# Patient Record
Sex: FEMALE | Race: WHITE
[De-identification: names, ages, dates, MRNs, and addresses within clinical notes are randomized per-mention and may not be internally consistent; named-entity substitution may affect disease eponyms.]

---

## 2017-02-16 ENCOUNTER — HOSPITAL ENCOUNTER (OUTPATIENT)
Dept: HOSPITAL 74 - JONCCHEMO | Age: 82
Discharge: HOME | End: 2017-02-16
Attending: INTERNAL MEDICINE
Payer: COMMERCIAL

## 2017-02-16 VITALS — HEART RATE: 82 BPM | DIASTOLIC BLOOD PRESSURE: 54 MMHG | TEMPERATURE: 98.3 F | SYSTOLIC BLOOD PRESSURE: 108 MMHG

## 2017-02-16 VITALS — BODY MASS INDEX: 27.8 KG/M2

## 2017-02-16 DIAGNOSIS — C91.10: ICD-10-CM

## 2017-02-16 DIAGNOSIS — Z51.11: Primary | ICD-10-CM

## 2017-02-16 LAB
ALBUMIN SERPL-MCNC: 3.6 G/DL (ref 3.4–5)
ALP SERPL-CCNC: 81 U/L (ref 45–117)
ALT SERPL-CCNC: 26 U/L (ref 12–78)
ANION GAP SERPL CALC-SCNC: 8 MMOL/L (ref 8–16)
AST SERPL-CCNC: 22 U/L (ref 15–37)
BASOPHILS # BLD: 1.2 % (ref 0–2)
BASOPHILS # BLD: 1.3 % (ref 0–2)
BILIRUB SERPL-MCNC: 0.6 MG/DL (ref 0.2–1)
CALCIUM SERPL-MCNC: 9.1 MG/DL (ref 8.5–10.1)
CO2 SERPL-SCNC: 29 MMOL/L (ref 21–32)
CREAT SERPL-MCNC: 0.9 MG/DL (ref 0.55–1.02)
DEPRECATED RDW RBC AUTO: 13 % (ref 11.6–15.6)
DEPRECATED RDW RBC AUTO: 13.3 % (ref 11.6–15.6)
EOSINOPHIL # BLD: 4.7 % (ref 0–4.5)
EOSINOPHIL # BLD: 5.8 % (ref 0–4.5)
GLUCOSE SERPL-MCNC: 129 MG/DL (ref 74–106)
MCH RBC QN AUTO: 29.1 PG (ref 25.7–33.7)
MCH RBC QN AUTO: 29.2 PG (ref 25.7–33.7)
MCHC RBC AUTO-ENTMCNC: 33 G/DL (ref 32–36)
MCHC RBC AUTO-ENTMCNC: 33.4 G/DL (ref 32–36)
MCV RBC: 87.6 FL (ref 80–96)
MCV RBC: 88.1 FL (ref 80–96)
NEUTROPHILS # BLD: 58.5 % (ref 42.8–82.8)
NEUTROPHILS # BLD: 62 % (ref 42.8–82.8)
PLATELET # BLD AUTO: 218 K/MM3 (ref 134–434)
PLATELET # BLD AUTO: 225 K/MM3 (ref 134–434)
PMV BLD: 9.1 FL (ref 7.5–11.1)
PMV BLD: 9.2 FL (ref 7.5–11.1)
PROT SERPL-MCNC: 7.7 G/DL (ref 6.4–8.2)
WBC # BLD AUTO: 6.4 K/MM3 (ref 4–10)
WBC # BLD AUTO: 6.4 K/MM3 (ref 4–10)

## 2017-02-16 PROCEDURE — 3E03305 INTRODUCTION OF OTHER ANTINEOPLASTIC INTO PERIPHERAL VEIN, PERCUTANEOUS APPROACH: ICD-10-PCS | Performed by: INTERNAL MEDICINE

## 2017-02-16 PROCEDURE — 3E0337Z INTRODUCTION OF ELECTROLYTIC AND WATER BALANCE SUBSTANCE INTO PERIPHERAL VEIN, PERCUTANEOUS APPROACH: ICD-10-PCS | Performed by: INTERNAL MEDICINE

## 2017-02-16 PROCEDURE — 3E033GC INTRODUCTION OF OTHER THERAPEUTIC SUBSTANCE INTO PERIPHERAL VEIN, PERCUTANEOUS APPROACH: ICD-10-PCS | Performed by: INTERNAL MEDICINE

## 2017-02-17 LAB
IGG SERPL-MCNC: 1300 MG/DL (ref 700–1600)
IGM SERPL-MCNC: 188 MG/DL (ref 26–217)

## 2017-02-21 LAB
ALBUMIN SERPL-MCNC: 3.5 G/DL (ref 2.9–4.4)
ALBUMIN/GLOB SERPL: 0.9 {RATIO} (ref 0.7–1.7)
GLOBULIN SER CALC-MCNC: 4 G/DL (ref 2.2–3.9)
PROT SERPL-MCNC: 7.5 G/DL (ref 6–8.5)

## 2017-04-12 ENCOUNTER — HOSPITAL ENCOUNTER (OUTPATIENT)
Dept: HOSPITAL 74 - JONCCHEMO | Age: 82
Discharge: HOME | End: 2017-04-12
Attending: INTERNAL MEDICINE
Payer: COMMERCIAL

## 2017-04-12 VITALS — SYSTOLIC BLOOD PRESSURE: 118 MMHG | HEART RATE: 78 BPM | DIASTOLIC BLOOD PRESSURE: 71 MMHG | TEMPERATURE: 97.8 F

## 2017-04-12 DIAGNOSIS — M06.9: ICD-10-CM

## 2017-04-12 DIAGNOSIS — C91.10: ICD-10-CM

## 2017-04-12 DIAGNOSIS — Z51.11: Primary | ICD-10-CM

## 2017-04-12 DIAGNOSIS — D47.2: ICD-10-CM

## 2017-04-12 LAB
ALBUMIN SERPL-MCNC: 3.6 G/DL (ref 3.4–5)
ALP SERPL-CCNC: 82 U/L (ref 45–117)
ALT SERPL-CCNC: 25 U/L (ref 12–78)
ANION GAP SERPL CALC-SCNC: 8 MMOL/L (ref 8–16)
AST SERPL-CCNC: 25 U/L (ref 15–37)
BASOPHILS # BLD: 0.9 % (ref 0–2)
BILIRUB CONJ SERPL-MCNC: 0.1 MG/DL (ref 0–0.2)
BILIRUB DIRECT SERPL-MCNC: 184 U/L (ref 84–246)
BILIRUB SERPL-MCNC: 0.5 MG/DL (ref 0.2–1)
CALCIUM SERPL-MCNC: 9.3 MG/DL (ref 8.5–10.1)
CO2 SERPL-SCNC: 29 MMOL/L (ref 21–32)
COCKROFT - GAULT: 53.66
CREAT SERPL-MCNC: 0.9 MG/DL (ref 0.55–1.02)
DEPRECATED RDW RBC AUTO: 13.1 % (ref 11.6–15.6)
EOSINOPHIL # BLD: 6.3 % (ref 0–4.5)
GLUCOSE SERPL-MCNC: 142 MG/DL (ref 74–106)
MAGNESIUM SERPL-MCNC: 2.5 MG/DL (ref 1.8–2.4)
MCH RBC QN AUTO: 28.8 PG (ref 25.7–33.7)
MCHC RBC AUTO-ENTMCNC: 32.7 G/DL (ref 32–36)
MCV RBC: 88.3 FL (ref 80–96)
NEUTROPHILS # BLD: 55.3 % (ref 42.8–82.8)
PLATELET # BLD AUTO: 214 K/MM3 (ref 134–434)
PMV BLD: 9.3 FL (ref 7.5–11.1)
PROT SERPL-MCNC: 7.2 G/DL (ref 6.4–8.2)
URATE SERPL-SCNC: 4.5 MG/DL (ref 2.6–7.2)
WBC # BLD AUTO: 5.6 K/MM3 (ref 4–10)

## 2017-04-12 PROCEDURE — 3E033GC INTRODUCTION OF OTHER THERAPEUTIC SUBSTANCE INTO PERIPHERAL VEIN, PERCUTANEOUS APPROACH: ICD-10-PCS | Performed by: INTERNAL MEDICINE

## 2017-04-12 PROCEDURE — 3E0337Z INTRODUCTION OF ELECTROLYTIC AND WATER BALANCE SUBSTANCE INTO PERIPHERAL VEIN, PERCUTANEOUS APPROACH: ICD-10-PCS | Performed by: INTERNAL MEDICINE

## 2017-04-12 PROCEDURE — 3E03305 INTRODUCTION OF OTHER ANTINEOPLASTIC INTO PERIPHERAL VEIN, PERCUTANEOUS APPROACH: ICD-10-PCS | Performed by: INTERNAL MEDICINE

## 2017-04-13 LAB
IGG SERPL-MCNC: 1122 MG/DL (ref 700–1600)
IGM SERPL-MCNC: 164 MG/DL (ref 26–217)

## 2017-06-07 ENCOUNTER — HOSPITAL ENCOUNTER (OUTPATIENT)
Dept: HOSPITAL 74 - JONCCHEMO | Age: 82
Discharge: HOME | End: 2017-06-07
Attending: INTERNAL MEDICINE
Payer: COMMERCIAL

## 2017-06-07 VITALS — TEMPERATURE: 98.3 F | HEART RATE: 73 BPM | DIASTOLIC BLOOD PRESSURE: 76 MMHG | SYSTOLIC BLOOD PRESSURE: 154 MMHG

## 2017-06-07 DIAGNOSIS — M06.9: ICD-10-CM

## 2017-06-07 DIAGNOSIS — C91.10: ICD-10-CM

## 2017-06-07 DIAGNOSIS — D47.2: ICD-10-CM

## 2017-06-07 DIAGNOSIS — Z51.11: Primary | ICD-10-CM

## 2017-06-07 LAB
ALBUMIN SERPL-MCNC: 3.7 G/DL (ref 3.4–5)
ALP SERPL-CCNC: 81 U/L (ref 45–117)
ALT SERPL-CCNC: 27 U/L (ref 12–78)
ANION GAP SERPL CALC-SCNC: 9 MMOL/L (ref 8–16)
AST SERPL-CCNC: 21 U/L (ref 15–37)
BASOPHILS # BLD: 1.3 % (ref 0–2)
BILIRUB DIRECT SERPL-MCNC: 168 U/L (ref 84–246)
BILIRUB SERPL-MCNC: 0.5 MG/DL (ref 0.2–1)
CALCIUM SERPL-MCNC: 9.6 MG/DL (ref 8.5–10.1)
CO2 SERPL-SCNC: 28 MMOL/L (ref 21–32)
COCKROFT - GAULT: 49.3
CREAT SERPL-MCNC: 0.9 MG/DL (ref 0.55–1.02)
DEPRECATED RDW RBC AUTO: 13 % (ref 11.6–15.6)
EOSINOPHIL # BLD: 4.4 % (ref 0–4.5)
GLUCOSE SERPL-MCNC: 197 MG/DL (ref 74–106)
MAGNESIUM SERPL-MCNC: 2.5 MG/DL (ref 1.8–2.4)
MCH RBC QN AUTO: 29.5 PG (ref 25.7–33.7)
MCHC RBC AUTO-ENTMCNC: 33.4 G/DL (ref 32–36)
MCV RBC: 88.2 FL (ref 80–96)
NEUTROPHILS # BLD: 58.7 % (ref 42.8–82.8)
PLATELET # BLD AUTO: 199 K/MM3 (ref 134–434)
PMV BLD: 9.2 FL (ref 7.5–11.1)
PROT SERPL-MCNC: 7.5 G/DL (ref 6.4–8.2)
WBC # BLD AUTO: 5.6 K/MM3 (ref 4–10)

## 2017-08-01 ENCOUNTER — HOSPITAL ENCOUNTER (OUTPATIENT)
Dept: HOSPITAL 74 - JONCCHEMO | Age: 82
Discharge: HOME | End: 2017-08-01
Attending: INTERNAL MEDICINE
Payer: COMMERCIAL

## 2017-08-01 VITALS — SYSTOLIC BLOOD PRESSURE: 172 MMHG | TEMPERATURE: 97.9 F | DIASTOLIC BLOOD PRESSURE: 92 MMHG | HEART RATE: 80 BPM

## 2017-08-01 DIAGNOSIS — D47.2: ICD-10-CM

## 2017-08-01 DIAGNOSIS — Z51.11: Primary | ICD-10-CM

## 2017-08-01 DIAGNOSIS — C91.10: ICD-10-CM

## 2017-08-01 DIAGNOSIS — M06.9: ICD-10-CM

## 2017-08-01 LAB
ALBUMIN SERPL-MCNC: 3.5 G/DL (ref 3.4–5)
ALP SERPL-CCNC: 84 U/L (ref 45–117)
ALT SERPL-CCNC: 25 U/L (ref 12–78)
ANION GAP SERPL CALC-SCNC: 7 MMOL/L (ref 8–16)
AST SERPL-CCNC: 18 U/L (ref 15–37)
BASOPHILS # BLD: 1.1 % (ref 0–2)
BILIRUB CONJ SERPL-MCNC: < 0.1 MG/DL (ref 0–0.2)
BILIRUB SERPL-MCNC: 0.4 MG/DL (ref 0.2–1)
CALCIUM SERPL-MCNC: 9.4 MG/DL (ref 8.5–10.1)
CO2 SERPL-SCNC: 32 MMOL/L (ref 21–32)
CREAT SERPL-MCNC: 0.9 MG/DL (ref 0.55–1.02)
DEPRECATED RDW RBC AUTO: 12.8 % (ref 11.6–15.6)
EOSINOPHIL # BLD: 4.5 % (ref 0–4.5)
GLUCOSE SERPL-MCNC: 144 MG/DL (ref 74–106)
MCH RBC QN AUTO: 29.2 PG (ref 25.7–33.7)
MCHC RBC AUTO-ENTMCNC: 33.3 G/DL (ref 32–36)
MCV RBC: 87.7 FL (ref 80–96)
NEUTROPHILS # BLD: 58 % (ref 42.8–82.8)
PLATELET # BLD AUTO: 215 K/MM3 (ref 134–434)
PMV BLD: 8.8 FL (ref 7.5–11.1)
PROT SERPL-MCNC: 7.2 G/DL (ref 6.4–8.2)
WBC # BLD AUTO: 5.9 K/MM3 (ref 4–10)

## 2019-12-17 ENCOUNTER — HOSPITAL ENCOUNTER (OUTPATIENT)
Dept: HOSPITAL 74 - JASU-SURG | Age: 84
Discharge: HOME | End: 2019-12-17
Attending: UROLOGY
Payer: COMMERCIAL

## 2019-12-17 VITALS — HEART RATE: 78 BPM | DIASTOLIC BLOOD PRESSURE: 69 MMHG | SYSTOLIC BLOOD PRESSURE: 124 MMHG

## 2019-12-17 VITALS — TEMPERATURE: 97.5 F

## 2019-12-17 VITALS — BODY MASS INDEX: 29.2 KG/M2

## 2019-12-17 DIAGNOSIS — N20.0: Primary | ICD-10-CM

## 2019-12-17 PROCEDURE — 0TF4XZZ FRAGMENTATION IN LEFT KIDNEY PELVIS, EXTERNAL APPROACH: ICD-10-PCS | Performed by: UROLOGY

## 2019-12-17 NOTE — OP
DATE OF OPERATION:  12/17/2019

 

PREOPERATIVE DIAGNOSIS:  Left renal calculus.

 

POSTOPERATIVE DIAGNOSIS:  Left renal calculus.

 

PROCEDURE:  Left lithotripsy.

 

HISTORY:  This is a very pleasant, 87-year-old female with a long history of renal

calculi.  The patient was found to have a 7-mm stone on preoperative imaging.  Due to

the patient's overall relatively good state of health for her age, it was elected to

proceed with treatment for her stone.  Risks and benefits of observation and

treatment and alternative treatment were discussed in detail.  All questions were

answered.

 

BRIEF OPERATIVE NOTE:  Patient was brought into the operative suite, placed in supine

position.  Once the stone was localized using 3-D imaging, timeout was performed, and

IV antibiotics were given.  The patient was then sedated.  Approximately 2500 shocks

were delivered in a _____ fashion.  Patient tolerated procedure well _____ fragment

radiographically.  Patient brought to the recovery room in stable and satisfactory

condition.

 

 

KENDELL ZALDIVAR/[id number]

DD: 12/17/2019 11:52

DT: 12/17/2019 12:57

Job #:  [id number]

## 2019-12-17 NOTE — OP
Operative Note





- Note:


Operative Date: 12/17/19


Pre-Operative Diagnosis: Left renal calculus


Operation: Left Lithotripsy


Findings: 





7 mm left lp stone


Surgeon: Eduard Trammell MD.


Anesthesia: General


Operative Report Dictated: Yes